# Patient Record
Sex: MALE | Race: OTHER | HISPANIC OR LATINO | URBAN - METROPOLITAN AREA
[De-identification: names, ages, dates, MRNs, and addresses within clinical notes are randomized per-mention and may not be internally consistent; named-entity substitution may affect disease eponyms.]

---

## 2020-05-31 ENCOUNTER — EMERGENCY (EMERGENCY)
Facility: HOSPITAL | Age: 35
LOS: 1 days | Discharge: HOME | End: 2020-05-31
Attending: EMERGENCY MEDICINE
Payer: COMMERCIAL

## 2020-05-31 VITALS
TEMPERATURE: 98 F | WEIGHT: 199.96 LBS | HEIGHT: 71 IN | OXYGEN SATURATION: 97 % | HEART RATE: 89 BPM | SYSTOLIC BLOOD PRESSURE: 138 MMHG | RESPIRATION RATE: 18 BRPM | DIASTOLIC BLOOD PRESSURE: 74 MMHG

## 2020-05-31 PROCEDURE — 99283 EMERGENCY DEPT VISIT LOW MDM: CPT

## 2020-05-31 PROCEDURE — 99282 EMERGENCY DEPT VISIT SF MDM: CPT

## 2020-05-31 NOTE — ED PROVIDER NOTE - OBJECTIVE STATEMENT
33 yo male no PMHx presents to ED c/o sore throat x4 hours. Eating/drinking normally. Associated with diarrhea only today. Patient has been taking an antibiotic from [facility], [place]. Patient wishes to be tested for COVID 19. No further complaints at this time.  Denies fever, chest pain, cough, SOB, abdominal pain. 33 yo male no PMHx presents to ED c/o sore throat x4 days. Eating/drinking normally. Associated with diarrhea only today. Patient has been taking an antibiotic from [facility]. Patient wishes to be tested for COVID 19. No further complaints at this time.  Denies fever, chest pain, cough, SOB, abdominal pain.

## 2020-05-31 NOTE — ED PROVIDER NOTE - CLINICAL SUMMARY MEDICAL DECISION MAKING FREE TEXT BOX
35 yo male no PMHx presents to ED c/o sore throat x4 days. Afebrile, throat clear. Tolerating secretions, no trismus. Patient stable for discharge with PCP follow up.

## 2020-05-31 NOTE — ED PROVIDER NOTE - PATIENT PORTAL LINK FT
You can access the FollowMyHealth Patient Portal offered by [facility] by registering at the following website: [email or web address]. By joining [facility]’s FollowMyHealth portal, you will also be able to view your health information using other applications (apps) compatible with our system.

## 2020-05-31 NOTE — ED PROVIDER NOTE - NSFOLLOWUPINSTRUCTIONS_ED_ALL_ED_FT
- Do not continue over the counter antibiotics.   - Ibuprofen/acetaminophen for pain.  - Over the counter lozenges.  - Tea with honey.   - Please call to schedule follow up appointment with your primary care physician within 24-48 hours.  - Please seek immediate medical attention for any new/worsening, signs/symptoms, or concerns.    Feel better!

## 2020-05-31 NOTE — ED PROVIDER NOTE - PHYSICAL EXAMINATION
General: A&Ox3, in NAD, non-toxic appearing; well nourished/developed. Phonation normal.  Skin: No rashes or lesions. Warm, dry, color normal for race.   Eyes: Sclera anicteric, conjunctivae clear b/l. No discharge. PERRLA, EOMI.  Ears: Auricles/tragi symmetrical without lesions/deformities, non-tender b/l. Canals clear and without erythema b/l. TMs pearly, grey, mobile, with landmarks and light reflex intact b/l.  Throat: Airway patent. Tolerating secretions, no drooling or trismus. Moist mucus membranes. Tonsils and pharynx without erythema or exudates. Tonsils not enlarged. No unilateral enlargement. Uvula midline, no edema or swelling, rises symmetrically.  Neck: Supple, FROM. No lymphadenopathy or pre/postauricular, occipital, tonsillar, submandibular, submental, superficial/deep cervical, or supraclavicular nodes.  Cardio: No lifts, heaves, visible pulsations. No thrills. Rate and rhythm regular, S1 & S2 clear. No audible murmur, gallop, or rub.  Resp: Speaking in full sentences. No visible nasal flaring, retractions, or deformity. Normal AP to lateral diameter, symmetrical excursion b/l. Breath sounds vesicular, symmetrical and without rales, rhonchi or wheezing b/l.  Abd: Non-distended. Soft, non-tender, no masses palpated. No rebound, guarding.

## 2024-02-27 PROBLEM — Z78.9 OTHER SPECIFIED HEALTH STATUS: Chronic | Status: ACTIVE | Noted: 2020-05-31

## 2024-05-19 PROBLEM — Z00.00 ENCOUNTER FOR PREVENTIVE HEALTH EXAMINATION: Status: ACTIVE | Noted: 2024-05-19

## 2024-05-21 ENCOUNTER — APPOINTMENT (OUTPATIENT)
Dept: GASTROENTEROLOGY | Facility: CLINIC | Age: 39
End: 2024-05-21
Payer: COMMERCIAL

## 2024-05-21 VITALS
SYSTOLIC BLOOD PRESSURE: 120 MMHG | HEIGHT: 71 IN | BODY MASS INDEX: 30.24 KG/M2 | WEIGHT: 216 LBS | DIASTOLIC BLOOD PRESSURE: 70 MMHG | HEART RATE: 70 BPM | RESPIRATION RATE: 16 BRPM | OXYGEN SATURATION: 98 %

## 2024-05-21 DIAGNOSIS — E11.11 TYPE 2 DIABETES MELLITUS WITH KETOACIDOSIS WITH COME: ICD-10-CM

## 2024-05-21 DIAGNOSIS — E78.5 HYPERLIPIDEMIA, UNSPECIFIED: ICD-10-CM

## 2024-05-21 DIAGNOSIS — B18.1 CHRONIC VIRAL HEPATITIS B W/OUT DELTA-AGENT: ICD-10-CM

## 2024-05-21 DIAGNOSIS — Z78.9 OTHER SPECIFIED HEALTH STATUS: ICD-10-CM

## 2024-05-21 PROCEDURE — 99204 OFFICE O/P NEW MOD 45 MIN: CPT

## 2024-05-21 RX ORDER — ROSUVASTATIN CALCIUM 10 MG/1
10 TABLET, FILM COATED ORAL
Refills: 0 | Status: ACTIVE | COMMUNITY

## 2024-05-21 RX ORDER — METFORMIN ER 500 MG 500 MG/1
500 TABLET ORAL
Refills: 0 | Status: ACTIVE | COMMUNITY

## 2024-05-21 NOTE — PHYSICAL EXAM
[None] : no edema [de-identified] : Well-developed well-nourished quite robust in appearance.  No acute distress. [FreeTextEntry1] : [place] sclera. [de-identified] : No pharyngitis. [de-identified] : No neck masses.  No adenopathy. [de-identified] : Clear to A&P. [de-identified] : No MRG. [de-identified] : Flat soft bowel sounds present.  No organomegaly.  No mass tenderness or rebound.  No hernias [de-identified] : Deferred today. [de-identified] : Normal. [de-identified] : Normal. [de-identified] : Normal.

## 2024-05-21 NOTE — ASSESSMENT
[FreeTextEntry1] : Fatty liver on CAT scan.  History of obesity and hyperlipidemia.  Likely MAFLD. Hepatitis B carrier state.  Normal transaminases.  Minor elevation of hepatitis B surface antigen.  Total titer 15. Still potentially infectious at low level of infectivity.  Warned about intermittent personal contact.,  Condoms advised. Plan FibroScan; repeat blood work to include CBC, CMP, DG, ferritin.  Advised the surface antigen, hepatitis B E antigen.  Hepatitis B E antibody.  Hepatitis B viral titer.  Alpha-fetoprotein. Referral to hepatology Dr. Blackwood. GI office follow-up here in 6 months.

## 2024-05-21 NOTE — REASON FOR VISIT
[Consultation] : a consultation visit [FreeTextEntry1] : Fatty liver.  Diabetes.  Obesity.  Positive hepatitis B surface antigen with low titer.

## 2024-06-05 ENCOUNTER — APPOINTMENT (OUTPATIENT)
Dept: GASTROENTEROLOGY | Facility: CLINIC | Age: 39
End: 2024-06-05
Payer: COMMERCIAL

## 2024-06-05 DIAGNOSIS — K76.0 FATTY (CHANGE OF) LIVER, NOT ELSEWHERE CLASSIFIED: ICD-10-CM

## 2024-06-05 PROCEDURE — 91200 LIVER ELASTOGRAPHY: CPT

## 2024-06-10 PROBLEM — K76.0 FATTY LIVER: Status: ACTIVE | Noted: 2024-05-21

## 2024-06-10 NOTE — ASSESSMENT
[FreeTextEntry1] : FibroScan/ Vibration Controlled Transient Elastography (VCTE) Report   PROBE SIZE: M   INDICATION: NAFLD/BARDALES   REFERRING PHYSICIAN: Dr. Tito Pinto     PROCEDURE:   Patient was NPO for 4 hours prior to procedure. After providing oral explanations of the procedure to the patient, patient was placed in supine position with right arm in maximum abduction to allow optimal exposure of right lateral abdomen. Patient abdomen was briefly assessed to identify to the terminus of the xyphoid process. The ideal target testing spot was located mid-line and lateral to this point. To acquire proper signals, the skin to liver capsule distance was accessed with the FibroScan probe. Patient was instructed to breathe normally and to abstain from sudden movements during the procedure. Ten shear waves were produced and measurements of the speed of each wave evaluated. Patient tolerated the procedure well and was discharged without incident.     FINDINGS:   - Median shear wave speed of 1.17 meters/second.   - This corresponds to a median Liver Stiffness Score of 4.1 kPa.   - IQR/med 7%   -  dB/m:   RESULTS based on  reference scales:   FIBROSIS: F0/F1 Fibrosis   STEATOSIS: S0 - Stage (0-10% steatosis)   The results regarding fibrosis stage and/or steatosis grade is based on current published scientific literature and the provided patient's diagnosis. Any further medical or surgical intervention should be made by considering the overall medical condition of the patient.   Please note transient elastography does not directly measure liver fibrosis and fat content, therefore over-estimation of liver fibrosis may be observed in several conditions, including but not limited to: liver congestion, active hepatitis, ascites, mass within the liver. Clinical correlation is necessary, and liver stiffness readings need to be interpreted with consideration to these potential confounding factors.